# Patient Record
Sex: FEMALE | Race: WHITE
[De-identification: names, ages, dates, MRNs, and addresses within clinical notes are randomized per-mention and may not be internally consistent; named-entity substitution may affect disease eponyms.]

---

## 2018-04-03 ENCOUNTER — HOSPITAL ENCOUNTER (INPATIENT)
Dept: HOSPITAL 26 - MED | Age: 58
LOS: 2 days | Discharge: HOME | DRG: 872 | End: 2018-04-05
Attending: INTERNAL MEDICINE | Admitting: INTERNAL MEDICINE
Payer: COMMERCIAL

## 2018-04-03 VITALS — SYSTOLIC BLOOD PRESSURE: 93 MMHG | DIASTOLIC BLOOD PRESSURE: 52 MMHG

## 2018-04-03 VITALS — DIASTOLIC BLOOD PRESSURE: 46 MMHG | SYSTOLIC BLOOD PRESSURE: 92 MMHG

## 2018-04-03 VITALS — WEIGHT: 143 LBS | HEIGHT: 62 IN | BODY MASS INDEX: 26.31 KG/M2

## 2018-04-03 VITALS — DIASTOLIC BLOOD PRESSURE: 66 MMHG | SYSTOLIC BLOOD PRESSURE: 108 MMHG

## 2018-04-03 DIAGNOSIS — N10: ICD-10-CM

## 2018-04-03 DIAGNOSIS — M19.90: ICD-10-CM

## 2018-04-03 DIAGNOSIS — I10: ICD-10-CM

## 2018-04-03 DIAGNOSIS — D64.9: ICD-10-CM

## 2018-04-03 DIAGNOSIS — B96.89: ICD-10-CM

## 2018-04-03 DIAGNOSIS — K75.9: ICD-10-CM

## 2018-04-03 DIAGNOSIS — Z90.49: ICD-10-CM

## 2018-04-03 DIAGNOSIS — K76.0: ICD-10-CM

## 2018-04-03 DIAGNOSIS — A41.9: Primary | ICD-10-CM

## 2018-04-03 DIAGNOSIS — E87.5: ICD-10-CM

## 2018-04-03 LAB
ALBUMIN FLD-MCNC: 2.8 G/DL (ref 3.4–5)
ANION GAP SERPL CALCULATED.3IONS-SCNC: 11.5 MMOL/L (ref 8–16)
APPEARANCE UR: (no result)
AST SERPL-CCNC: 153 U/L (ref 15–37)
BASOPHILS # BLD AUTO: 0 K/UL (ref 0–0.22)
BASOPHILS NFR BLD AUTO: 0.2 % (ref 0–2)
BILIRUB SERPL-MCNC: 0.7 MG/DL (ref 0–1)
BILIRUB UR QL STRIP: (no result)
BUN SERPL-MCNC: 15 MG/DL (ref 7–18)
CHLORIDE SERPL-SCNC: 101 MMOL/L (ref 98–107)
CO2 SERPL-SCNC: 27.8 MMOL/L (ref 21–32)
COLOR UR: YELLOW
CREAT SERPL-MCNC: 0.9 MG/DL (ref 0.6–1.3)
EOSINOPHIL # BLD AUTO: 0 K/UL (ref 0–0.4)
EOSINOPHIL NFR BLD AUTO: 0 % (ref 0–4)
ERYTHROCYTE [DISTWIDTH] IN BLOOD BY AUTOMATED COUNT: 12.4 % (ref 11.6–13.7)
GFR SERPL CREATININE-BSD FRML MDRD: 83 ML/MIN (ref 90–?)
GLUCOSE SERPL-MCNC: 140 MG/DL (ref 74–106)
GLUCOSE UR STRIP-MCNC: NEGATIVE MG/DL
HCT VFR BLD AUTO: 34.2 % (ref 36–48)
HGB BLD-MCNC: 11.3 G/DL (ref 12–16)
HGB UR QL STRIP: (no result)
LEUKOCYTE ESTERASE UR QL STRIP: (no result)
LYMPHOCYTES # BLD AUTO: 0.6 K/UL (ref 2.5–16.5)
LYMPHOCYTES NFR BLD AUTO: 4 % (ref 20.5–51.1)
MCH RBC QN AUTO: 31 PG (ref 27–31)
MCHC RBC AUTO-ENTMCNC: 33 G/DL (ref 33–37)
MCV RBC AUTO: 92.9 FL (ref 80–94)
MONOCYTES # BLD AUTO: 1.1 K/UL (ref 0.8–1)
MONOCYTES NFR BLD AUTO: 7.4 % (ref 1.7–9.3)
NEUTROPHILS # BLD AUTO: 12.6 K/UL (ref 1.8–7.7)
NEUTROPHILS NFR BLD AUTO: 88.4 % (ref 42.2–75.2)
NITRITE UR QL STRIP: POSITIVE
PH UR STRIP: 6 [PH] (ref 5–9)
PLATELET # BLD AUTO: 207 K/UL (ref 140–450)
POTASSIUM SERPL-SCNC: 3.3 MMOL/L (ref 3.5–5.1)
PROTHROMBIN TIME: 11.7 SECS (ref 10.8–13.4)
RBC # BLD AUTO: 3.68 MIL/UL (ref 4.2–5.4)
RBC #/AREA URNS HPF: (no result) /HPF (ref 0–5)
SODIUM SERPL-SCNC: 137 MMOL/L (ref 136–145)
WBC # BLD AUTO: 14.3 K/UL (ref 4.8–10.8)
WBC,URINE: (no result) /HPF (ref 0–5)

## 2018-04-03 RX ADMIN — ACETAMINOPHEN PRN MG: 325 TABLET ORAL at 22:59

## 2018-04-03 RX ADMIN — SODIUM CHLORIDE SCH MLS/HR: 9 INJECTION, SOLUTION INTRAVENOUS at 20:03

## 2018-04-03 RX ADMIN — ZOLPIDEM TARTRATE PRN MG: 10 TABLET, FILM COATED ORAL at 23:56

## 2018-04-03 RX ADMIN — SODIUM CHLORIDE SCH MLS/HR: 9 INJECTION, SOLUTION INTRAVENOUS at 16:05

## 2018-04-03 NOTE — NUR
Patient will be admitted to care of DR. MERLOS. Admited to TELE.  Will go to 
[id number]. Belongings list completed.  Report to EKYONNA VILCHIS. PT IS IN STABLE 
CONDITION AT THIS TIME.

## 2018-04-03 NOTE — NUR
RECEUIVED FROM AM RN IN BED AMBULATING COMING FROM RESTROOM INSIDE ROOM. PT. ABLE TO 
VERBALIZE NEEDS WELL IN ENGLISH. CARE PLANS FOR THE NIGHT AND CALL LIGHT USE DISCUSSED WITH 
HER. ROM X 4. PT. REQUESTING PAIN RELIEVER PER IVP . STATED THAT HER HEAD HURTS. EXPLAINED 
THAT HER SBP IS LESS THAN 100. STILL SHE SAYS IT IS OK FOR HER. SHE NEEDS HER MORPHINE IVP 
AND THAT SHE IS USED TO IT. WILL MEDICATE AS REQUESTED.

## 2018-04-03 NOTE — NUR
TEMP 102.4F, ER MD MADE AWARE, PT REFUSED ICE PACK, OFFERED ICE WATER AT THIS 
TIME, WILL MEDICATED.

## 2018-04-03 NOTE — NUR
PATIENT ADMITTED TO THE UNIT. PATIENT AWAKE, ALERT AND ORIENTED. NO S/S OF DISTRESS. PATIENT 
DENIES PAIN AT THIS TIME. PATIENT ON ROOM AIR. IV LINE NOTED TO THE RIGHT AC. PATIENT ON 
TELE MONITORING. BED LOWERED WITH CALL LIGHT WITHIN REACH. WILL CONTINUE TO MONITOR

## 2018-04-03 NOTE — NUR
-------------------------------------------------------------------------------

            *** Note undone in [facility] - 04/03/18 at 1220 by ISAIAH ***             

-------------------------------------------------------------------------------

PA

## 2018-04-03 NOTE — NUR
PT. AGREED THIS TIME TO AT LEAST PUT ICE BAG ON FOREHEAD.  AMBIEN 10 MG. P.O. WILL BE 
ADMINISTERED AS ORDERED AND REQUESTED.

## 2018-04-03 NOTE — NUR
TEMPERATURE CHECKED PER TEMPORAL 103. REFUSED COOLING MEASURES . MEDICATED WITH TYLENOL P.O. 
AS ORDERED. ENCOURAGED TO RELAX AND SLEEP. WILL MONITOR TEMPERATURE IN AN HOUR AGAIN. 
REFUSED COLD COMPRESS OR COOLING MEASURES.

## 2018-04-03 NOTE — NUR
PATIENT PRESENTS TO ED WITH C/O NVD, HEAD ACHE, NECK, BACK PAIN, X 3 DAYS SEND 
BY [facility] D/T HIGH WBC 25.7, LOW BP, HX; HTN, ARTHRITIS, INSOMNIA

RX; LISINOPRIL, AMBIEN, XANAX, CLARITIN . PATIENT STATES GENERALIZED PAIN 8/10 
AT THIS TIME; VSS; PATIENT POSITIONED FOR COMFORT; HOB ELEVATED; BEDRAILS UP 
X2; BED DOWN. ER MD MADE AWARE OF PT STATUS.

## 2018-04-03 NOTE — NUR
PT. SPOUSE IN HERE VISITING. PT. PROVIDED WITH WARM BLANKET BY LAINE RT COMPLAINING OF 
COLDNESS. STATED THAT SHE IS USING THICK BLANKETS AT HOME. SHE IS NOT USED TO COLD. ABLE TO 
VETRBALIZE NEEDS WELL. NO SOB.

## 2018-04-03 NOTE — NUR
PAGED AND ABLE TO TALK WITH ON CALL MD BLACK FOR MD MERLOS RE: PT. ASKING FOR AMBIEN 10 MG. 
P.O. MD BLACK DISCONTINUED ALL MORPHINE IVP RT BLOOD PRESSURE ON THE LOW  SIDE.  SBP IS LESS 
THAN 100 SINCE ADMISSION. ORDERED AMBIEN 10 MG. P.O.  AND DISCONTINUE MORPHINE IVP.

## 2018-04-04 VITALS — SYSTOLIC BLOOD PRESSURE: 98 MMHG | DIASTOLIC BLOOD PRESSURE: 58 MMHG

## 2018-04-04 VITALS — SYSTOLIC BLOOD PRESSURE: 91 MMHG | DIASTOLIC BLOOD PRESSURE: 56 MMHG

## 2018-04-04 VITALS — DIASTOLIC BLOOD PRESSURE: 41 MMHG | SYSTOLIC BLOOD PRESSURE: 90 MMHG

## 2018-04-04 VITALS — SYSTOLIC BLOOD PRESSURE: 93 MMHG | DIASTOLIC BLOOD PRESSURE: 56 MMHG

## 2018-04-04 VITALS — SYSTOLIC BLOOD PRESSURE: 90 MMHG | DIASTOLIC BLOOD PRESSURE: 59 MMHG

## 2018-04-04 VITALS — SYSTOLIC BLOOD PRESSURE: 106 MMHG | DIASTOLIC BLOOD PRESSURE: 62 MMHG

## 2018-04-04 VITALS — DIASTOLIC BLOOD PRESSURE: 48 MMHG | SYSTOLIC BLOOD PRESSURE: 94 MMHG

## 2018-04-04 LAB
ALBUMIN FLD-MCNC: 2.2 G/DL (ref 3.4–5)
ANION GAP SERPL CALCULATED.3IONS-SCNC: 11.3 MMOL/L (ref 8–16)
AST SERPL-CCNC: 45 U/L (ref 15–37)
BILIRUB SERPL-MCNC: 0.3 MG/DL (ref 0–1)
BUN SERPL-MCNC: 11 MG/DL (ref 7–18)
CHLORIDE SERPL-SCNC: 109 MMOL/L (ref 98–107)
CO2 SERPL-SCNC: 26.9 MMOL/L (ref 21–32)
CREAT SERPL-MCNC: 0.6 MG/DL (ref 0.6–1.3)
ERYTHROCYTE [DISTWIDTH] IN BLOOD BY AUTOMATED COUNT: 12 % (ref 11.6–13.7)
GFR SERPL CREATININE-BSD FRML MDRD: 133 ML/MIN (ref 90–?)
GLUCOSE SERPL-MCNC: 102 MG/DL (ref 74–106)
HCT VFR BLD AUTO: 30.3 % (ref 36–48)
HGB BLD-MCNC: 10 G/DL (ref 12–16)
LYMPHOCYTES NFR BLD MANUAL: 15 % (ref 20–46)
MCH RBC QN AUTO: 31 PG (ref 27–31)
MCHC RBC AUTO-ENTMCNC: 33 G/DL (ref 33–37)
MCV RBC AUTO: 93.3 FL (ref 80–94)
MONOCYTES NFR BLD MANUAL: 8 % (ref 5–12)
PLATELET # BLD AUTO: 198 K/UL (ref 140–450)
POTASSIUM SERPL-SCNC: 4.2 MMOL/L (ref 3.5–5.1)
RBC # BLD AUTO: 3.24 MIL/UL (ref 4.2–5.4)
SODIUM SERPL-SCNC: 143 MMOL/L (ref 136–145)
WBC # BLD AUTO: 14.4 K/UL (ref 4.8–10.8)

## 2018-04-04 RX ADMIN — SODIUM CHLORIDE SCH MLS/HR: 9 INJECTION, SOLUTION INTRAVENOUS at 23:07

## 2018-04-04 RX ADMIN — SODIUM CHLORIDE SCH MLS/HR: 9 INJECTION, SOLUTION INTRAVENOUS at 15:07

## 2018-04-04 RX ADMIN — IBUPROFEN SCH MG: 400 TABLET ORAL at 11:08

## 2018-04-04 RX ADMIN — ZOLPIDEM TARTRATE PRN MG: 10 TABLET, FILM COATED ORAL at 22:50

## 2018-04-04 RX ADMIN — ENOXAPARIN SODIUM SCH MG: 40 INJECTION SUBCUTANEOUS at 09:00

## 2018-04-04 RX ADMIN — ACETAMINOPHEN PRN MG: 325 TABLET ORAL at 22:51

## 2018-04-04 RX ADMIN — SODIUM CHLORIDE SCH MLS/HR: 9 INJECTION, SOLUTION INTRAVENOUS at 00:01

## 2018-04-04 RX ADMIN — IBUPROFEN SCH MG: 400 TABLET ORAL at 18:16

## 2018-04-04 NOTE — NUR
4/4/18 

RD INITIAL ASSESSMENT COMPLETED



PLEASE REFER TO NUTRITION ASSESSMENT UNDER CARE ACTIVITY FOR ESTIMATED NUTRITIONAL NEEDS. 



1. RECOMMEND BRAT DIET AS TOLERATED D/T PT REPORTS N/V

2. WHEN APPROPRIATE, ADVANCE TO CARDIAC DIET AS TOLERATED

3. RD TO FOLLOW-UP 3-5 DAYS, MODERATE RISK 



STACIE BLANDON, ZAHRAA

## 2018-04-04 NOTE — NUR
PATIENT HAS BEEN SCREENED AND CATEGORIZED AS HIGH NUTRITION RISK. PATIENT WILL BE SEEN 
WITHIN 1-2 DAYS OF ADMISSION.



4/4/18 - 4/5/18



STACIE BLANDON RD

## 2018-04-04 NOTE — NUR
CM NOTE



[facility] SU [facility]LEY PH# [phone] REQUESTED FOR CLINICALS TO BE FAXED 
[phone].

INITIAL REVIEW FAXED TO [facility] [phone] ATTN: SU [facility] PH# 
[phone]. 



RECEIVED CALL FROM  ROSA STATING THAT SHE HAS SPOKEN WITH MARIE [facility] REPRESENTATIVE PH# [phone] OPTION 6, STATING THAT THIS ADMISSION IS APPROVED BY 
[facility] FOR 2 DAYS  AUTH# [id number], AND THAT REVIEWS SHOULD NOT BE SENT UNLESS PATIENT 
STAYS LONGER THAN 2 DAYS.

## 2018-04-04 NOTE — NUR
PATIENT AWAKE AT THIS TIME. PATIENT TALKING WITH FAMILY MEMBERS AT BEDSIDE. NO COMPLAINTS OF 
PAIN. PATIENT REQUESTED A BLANKET. PATIENT SAYS, "IT GETS COLD AT NIGHT". PATIENT PRESENTS 
WITH NO FEVER. WILL CONTINUE TO MONITOR PATIENT.

## 2018-04-04 NOTE — NUR
PATIENT AWAKE AT THIS TIME. FAMILY MEMBERS AT BEDSIDE. PATIENT PRESENTS WITH NO FEVER. NO 
RESPIRATORY DISTRESS NOTED AT THIS TIME.

## 2018-04-04 NOTE — NUR
RECEIVED FROM AM RN IN BED AWAKE AND WITH DAUGHTER VISITING. NO FEVER AT THIS TIME. NO PAIN 
COMPLAINTS DONE. VERBALIZES WELL. CALL LIGHT WITH IN REACH AND CARE PLANS FOR THE NIGHT 
DISCUSSED WITH THEM.

## 2018-04-04 NOTE — NUR
RECEIVED PATIENT REPORT FROM NIGHTSHIFT NURSE AT BEDSIDE. PATIENT ASLEEP BUT AROUSABLE TO 
NAME. PATIENT'S BREATHING WITHIN NORMAL LIMITS. NO COMPLAINTS OF PAIN. PATIENT'S TEMPERATURE 
IS WITHIN NORMAL LIMITS. PATIENT HAS IV 20G FLOWING NS  ML/HR INTO RIGHT FOREARM. 
UPDATED BOARD IN PATIENT'S ROOM AND PUT CALL LIGHT WITHIN REACH OF PATIENT. ENCOURAGED 
PATIENT TO CALL IF SHE NEEDS HELP WITH ANYTHING. WILL CONTINUE TO MONITOR THE PATIENT.

## 2018-04-04 NOTE — NUR
PATIENT AWAKE AT THIS TIME. NO COMPLAINTS OF PAIN. NO RESPIRATORY DISTRESS. WILL CONTINUE TO 
MONITOR PATIENT.

## 2018-04-04 NOTE — NUR
PATIENT ASLEEP AT THIS TIME. NO SIGNS OF PAIN. PATIENT PRESENTS WITH NO FEVER. WILL CONTINUE 
TO MONITOR PATIENT.

## 2018-04-04 NOTE — NUR
PATIENT FAMILY MEMBER AT BEDSIDE. PATIENT IS EATING BREAKFAST AT THIS TIME. NO COMPLAINTS OF 
PAIN. TEMPERATURE WITHIN NORMAL LIMITS. WILL CONTINUE TO MONITOR

## 2018-04-04 NOTE — NUR
SLEPT WELL THIS SHIFT. NO COMPLAINTS DONE. VITAL SIGNS TAKEN AT 0400 WITH GOOD AFFECT. HAPPY 
RT ABLE TO SLEEP. DENIES PAIN AT THIS TIME.

## 2018-04-04 NOTE — NUR
PATIENT FAMILY MEMBERS AT BEDSIDE. NO COMPLAINTS OF PAIN AT THIS TIME. NO RESPIRATORY 
DISTRESS. NO COMPLAINTS OF A FEVER. WILL CONTINUE TO MONITOR PATIENT.

## 2018-04-04 NOTE — NUR
CHECKED ON PT. SLEEPING. NO NOTED ADVERSE REACTIONS TO NEW IV ABT MERREM BEING INFUSED. CALL 
LIGHT AT BEDSIDE WITH IN REACH. A/O X 4. ROM X 4. ON TELEMETRY MONITORING.

## 2018-04-05 VITALS — SYSTOLIC BLOOD PRESSURE: 91 MMHG | DIASTOLIC BLOOD PRESSURE: 60 MMHG

## 2018-04-05 VITALS — SYSTOLIC BLOOD PRESSURE: 112 MMHG | DIASTOLIC BLOOD PRESSURE: 68 MMHG

## 2018-04-05 VITALS — DIASTOLIC BLOOD PRESSURE: 71 MMHG | SYSTOLIC BLOOD PRESSURE: 122 MMHG

## 2018-04-05 LAB
ALBUMIN FLD-MCNC: 2.4 G/DL (ref 3.4–5)
ANION GAP SERPL CALCULATED.3IONS-SCNC: 9.7 MMOL/L (ref 8–16)
AST SERPL-CCNC: 23 U/L (ref 15–37)
BASOPHILS # BLD AUTO: 0 K/UL (ref 0–0.22)
BASOPHILS NFR BLD AUTO: 0.3 % (ref 0–2)
BILIRUB SERPL-MCNC: 0.2 MG/DL (ref 0–1)
BUN SERPL-MCNC: 5 MG/DL (ref 7–18)
CHLORIDE SERPL-SCNC: 107 MMOL/L (ref 98–107)
CO2 SERPL-SCNC: 30.6 MMOL/L (ref 21–32)
CREAT SERPL-MCNC: 0.6 MG/DL (ref 0.6–1.3)
EOSINOPHIL # BLD AUTO: 0 K/UL (ref 0–0.4)
EOSINOPHIL NFR BLD AUTO: 0.5 % (ref 0–4)
ERYTHROCYTE [DISTWIDTH] IN BLOOD BY AUTOMATED COUNT: 12.6 % (ref 11.6–13.7)
GFR SERPL CREATININE-BSD FRML MDRD: 133 ML/MIN (ref 90–?)
GLUCOSE SERPL-MCNC: 109 MG/DL (ref 74–106)
HCT VFR BLD AUTO: 31.7 % (ref 36–48)
HGB BLD-MCNC: 10.5 G/DL (ref 12–16)
LYMPHOCYTES # BLD AUTO: 1.2 K/UL (ref 2.5–16.5)
LYMPHOCYTES NFR BLD AUTO: 12.1 % (ref 20.5–51.1)
MCH RBC QN AUTO: 31 PG (ref 27–31)
MCHC RBC AUTO-ENTMCNC: 33 G/DL (ref 33–37)
MCV RBC AUTO: 94 FL (ref 80–94)
MONOCYTES # BLD AUTO: 0.9 K/UL (ref 0.8–1)
MONOCYTES NFR BLD AUTO: 9 % (ref 1.7–9.3)
NEUTROPHILS # BLD AUTO: 7.7 K/UL (ref 1.8–7.7)
NEUTROPHILS NFR BLD AUTO: 78.1 % (ref 42.2–75.2)
PLATELET # BLD AUTO: 263 K/UL (ref 140–450)
POTASSIUM SERPL-SCNC: 3.3 MMOL/L (ref 3.5–5.1)
RBC # BLD AUTO: 3.37 MIL/UL (ref 4.2–5.4)
SODIUM SERPL-SCNC: 144 MMOL/L (ref 136–145)
WBC # BLD AUTO: 9.8 K/UL (ref 4.8–10.8)

## 2018-04-05 RX ADMIN — ACETAMINOPHEN PRN MG: 325 TABLET ORAL at 05:42

## 2018-04-05 RX ADMIN — SODIUM CHLORIDE SCH MLS/HR: 9 INJECTION, SOLUTION INTRAVENOUS at 05:42

## 2018-04-05 RX ADMIN — ENOXAPARIN SODIUM SCH MG: 40 INJECTION SUBCUTANEOUS at 08:24

## 2018-04-05 RX ADMIN — SODIUM CHLORIDE SCH MLS/HR: 9 INJECTION, SOLUTION INTRAVENOUS at 12:34

## 2018-04-05 RX ADMIN — IBUPROFEN SCH MG: 400 TABLET ORAL at 12:00

## 2018-04-05 RX ADMIN — SODIUM CHLORIDE SCH MLS/HR: 9 INJECTION, SOLUTION INTRAVENOUS at 07:07

## 2018-04-05 RX ADMIN — IBUPROFEN SCH MG: 400 TABLET ORAL at 08:22

## 2018-04-05 NOTE — NUR
RECEIVED REPORT FROM ELIZABETH RN FOR CONTINUITY OF CARE PATIENT AWAKE A/0X4 NO S/S OF RESP 
DISTRESS NOTED. C/O HEADACHE WILL MEDICATE.  ABLE TO MAKE NEEDS KNOWN. IV SITE RIGHT HAND 
GAUGE 20 INTACT AND PATENT. IVF INFUSING WELL. PLAN OF CARE DISCUSSED WITH THE PATIENT 
VITALS STABLE WILL CONTINUE TO MONITOR.

## 2018-04-05 NOTE — NUR
SPOKE WITH DR MERLOS , GAVE HIM PATIENT'S PHARMACY AT [facility]  FOR 
ANTIBIOTICS AND PAIN MEDS .

## 2018-04-05 NOTE — NUR
CM NOTE

CONCURRENT REVIEW FAXED TO [facility] / FAX# [phone], ATTN: JERAD 
#[phone]

## 2018-04-05 NOTE — NUR
SLEEPING WELL. ABLE TO VERBALIZE NEEDS WELL. NO SOB. WAKES UP EASILY. NEW IV LINE INSERTED 
TO RIGHT WRIST #22. GOOD BLOOD RETURN . TOLERATED WELL.